# Patient Record
Sex: FEMALE | Race: WHITE | Employment: FULL TIME | URBAN - METROPOLITAN AREA
[De-identification: names, ages, dates, MRNs, and addresses within clinical notes are randomized per-mention and may not be internally consistent; named-entity substitution may affect disease eponyms.]

---

## 2021-06-16 ENCOUNTER — HOSPITAL ENCOUNTER (OUTPATIENT)
Dept: GENERAL RADIOLOGY | Age: 66
Discharge: HOME | End: 2021-06-16
Payer: COMMERCIAL

## 2021-06-16 ENCOUNTER — OFFICE VISIT (OUTPATIENT)
Dept: ORTHOPEDIC SURGERY | Age: 66
End: 2021-06-16
Payer: COMMERCIAL

## 2021-06-16 VITALS
WEIGHT: 148 LBS | OXYGEN SATURATION: 100 % | BODY MASS INDEX: 27.94 KG/M2 | TEMPERATURE: 97.5 F | HEIGHT: 61 IN | HEART RATE: 83 BPM

## 2021-06-16 DIAGNOSIS — M75.01 ADHESIVE CAPSULITIS OF BOTH SHOULDERS: ICD-10-CM

## 2021-06-16 DIAGNOSIS — M75.02 ADHESIVE CAPSULITIS OF BOTH SHOULDERS: Primary | ICD-10-CM

## 2021-06-16 DIAGNOSIS — M75.01 ADHESIVE CAPSULITIS OF BOTH SHOULDERS: Primary | ICD-10-CM

## 2021-06-16 DIAGNOSIS — M75.02 ADHESIVE CAPSULITIS OF BOTH SHOULDERS: ICD-10-CM

## 2021-06-16 PROCEDURE — 99204 OFFICE O/P NEW MOD 45 MIN: CPT | Performed by: PHYSICAL MEDICINE & REHABILITATION

## 2021-06-16 PROCEDURE — 73030 X-RAY EXAM OF SHOULDER: CPT

## 2021-06-16 RX ORDER — IBUPROFEN 800 MG/1
800 TABLET ORAL
Qty: 30 TABLET | Refills: 0 | Status: SHIPPED | OUTPATIENT
Start: 2021-06-16

## 2021-06-16 RX ORDER — ESTRADIOL 0.5 MG/1
TABLET ORAL
COMMUNITY
Start: 2021-06-05

## 2021-06-16 NOTE — PATIENT INSTRUCTIONS
Frozen Shoulder: Care Instructions Your Care Instructions Frozen shoulder is stiffness, pain, and trouble moving your shoulder. It may happen after an injury or overuse, or from a disease such as diabetes or a stroke. You may have pain that keeps you from using your shoulder. However, you need to move your shoulder. If you do not move it, it will get more stiff and sore. Your doctor may order an X-ray to make sure there is not another cause for your stiff shoulder. You can treat frozen shoulder with heat, stretching, over-the-counter pain medicines, and physical therapy. Your doctor also may inject medicine into your shoulder to reduce pain and swelling. It can take a year or more to get better. Surgery is almost never needed. Follow-up care is a key part of your treatment and safety. Be sure to make and go to all appointments, and call your doctor if you are having problems. It's also a good idea to know your test results and keep a list of the medicines you take. How can you care for yourself at home? · Take pain medicines exactly as directed. ? If the doctor gave you a prescription medicine for pain, take it as prescribed. ? If you are not taking a prescription pain medicine, ask your doctor if you can take an over-the-counter medicine. · Put a heating pad set on low or a warm, wet towel wrapped in plastic on your shoulder. The heat may make it easier to stretch your shoulder. · Follow your doctor's advice for stretches and exercises. · Go to physical therapy if your doctor suggests it. · Try these stretching exercises to reduce stiffness if your doctor says it is okay. Do the exercises slowly to avoid injury. Put a warm, wet towel on your shoulder before exercising. Stop any exercise that increases pain. ? Pendulum exercise. While leaning forward and holding onto a table or the back of a chair with your good arm, bend at the waist, allowing your affected arm to hang straight down.  Swing the affected arm back and forth like a pendulum, then in circles that start small and gradually grow larger as pain allows. Try this for about 2 or 3 minutes, several times a day. Once pain begins to go away, you can do this exercise while holding a 1- or 2-pound weight. ? Wall climbing (to the side). Stand with your side to a wall so that your fingers can just touch it. Then turn so your body is turned slightly toward the wall. Walk the fingers of your injured arm up the wall as high as pain permits. Hold that position for a count of 15 to 30 seconds. Walk your fingers down to the starting position. Repeat 2 to 4 times, trying to reach higher each time. ? Wall climbing (to the front). Face a wall, standing so your fingers can just touch it. Walk the fingers of your affected arm up the wall as high as pain permits. Hold that position for a count of 15 to 30 seconds. Slowly walk your fingers to the starting position. Repeat 2 to 4 times, trying to reach higher each time. When should you call for help? Call your doctor now or seek immediate medical care if: 
  · You have severe pain.  
  · Your arm is cool or pale or changes color.  
  · You have tingling or numbness in your arm. Watch closely for changes in your health, and be sure to contact your doctor if: 
  · You have increased pain.  
  · You have new pain that develops in another area. For example, you have pain in your arm, hand, or elbow.  
  · You do not get better as expected. Where can you learn more? Go to http://www.gray.com/ Enter C048 in the search box to learn more about \"Frozen Shoulder: Care Instructions. \" Current as of: November 16, 2020               Content Version: 12.8 © 2080-0319 [employer]. Care instructions adapted under license by [employer] (which disclaims liability or warranty for this information).  If you have questions about a medical condition or this instruction, always ask your healthcare professional. [place] [address] any warranty or liability for your use of this information. Frozen Shoulder: Exercises Introduction Here are some examples of exercises for you to try. The exercises may be suggested for a condition or for rehabilitation. Start each exercise slowly. Ease off the exercises if you start to have pain. You will be told when to start these exercises and which ones will work best for you. How to do the exercises Neck stretches 1. Look straight ahead, and tip your right ear to your right shoulder. Do not let your left shoulder rise up as you tip your head to the right. 2. Hold 15 to 30 seconds. 3. Tilt your head to the left. Do not let your right shoulder rise up as you tip your head to the left. 4. Hold for 15 to 30 seconds. 5. Repeat 2 to 4 times to each side. Shoulder rolls 1. Sit comfortably with your feet shoulder-width apart. You can also do this exercise while standing. 2. Roll your shoulders up, then back, and then down in a smooth, circular motion. 3. Repeat 2 to 4 times. Shoulder flexion (lying down) For this exercise, you will need a wand. To make a wand, use a piece of PVC pipe or a broom handle with the broom removed. Make the wand about a foot wider than your shoulders. 1. Lie on your back, holding a wand with your hands. Your palms should face down as you hold the wand. Place your hands slightly wider than your shoulders. 2. Keeping your elbows straight, slowly raise your arms over your head until you feel a stretch in your shoulders, upper back, and chest. 
3. Hold 15 to 30 seconds. 4. Repeat 2 to 4 times. Shoulder rotation (lying down) To make a wand, use a piece of PVC pipe or a broom handle with the broom removed. Make the wand about a foot wider than your shoulders. 1. Lie on your back and hold a wand in both hands with your elbows bent and your palms up.  
2. Keeping your elbows close to your body, move the wand across your body toward the arm that has pain. 3. Hold for 15 to 30 seconds. 4. Repeat 2 to 4 times. Shoulder internal rotation with towel 1. Roll up a towel lengthwise. Hold the towel above and behind your head with the arm that is not sore. 2. With your sore arm, reach behind your back and grasp the towel. 3. Using the arm above your head, pull the towel upward until you feel a stretch on the front and outside of your sore shoulder. 4. Hold 15 to 30 seconds. 5. Relax and move the towel back down to the starting position. 6. Repeat 2 to 4 times. Shoulder blade squeeze 1. While standing with your arms at your sides, squeeze your shoulder blades together. Do not raise your shoulders up as you are squeezing. 2. Hold for 6 seconds. 3. Repeat 8 to 12 times. Follow-up care is a key part of your treatment and safety. Be sure to make and go to all appointments, and call your doctor if you are having problems. It's also a good idea to know your test results and keep a list of the medicines you take. Where can you learn more? Go to http://www.gray.com/ Enter R144 in the search box to learn more about \"Frozen Shoulder: Exercises. \" Current as of: November 16, 2020               Content Version: 12.8 © 6065-2686 [employer]. Care instructions adapted under license by [employer] (which disclaims liability or warranty for this information). If you have questions about a medical condition or this instruction, always ask your healthcare professional. [place] [address] any warranty or liability for your use of this information.
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
## 2021-06-16 NOTE — PROGRESS NOTES
Saira Hurtado presents today for   Chief Complaint   Patient presents with    Shoulder Pain     bilateral    Arm Pain     bilateral    Back Pain       Is someone accompanying this pt? no    Is the patient using any DME equipment during OV? no    Coordination of Care:  1. Have you been to the ER, urgent care clinic since your last visit? no  Hospitalized since your last visit? no    2. Have you seen or consulted any other health care providers outside of the [address] [place] [address] since your last visit? Yes, chiropractor. Include any pap smears or colon screening.  no

## 2021-06-17 ENCOUNTER — TELEPHONE (OUTPATIENT)
Dept: ORTHOPEDIC SURGERY | Age: 66
End: 2021-06-17

## 2021-07-01 ENCOUNTER — HOSPITAL ENCOUNTER (OUTPATIENT)
Dept: PHYSICAL THERAPY | Age: 66
Discharge: HOME | End: 2021-07-01
Attending: PHYSICAL MEDICINE & REHABILITATION
Payer: COMMERCIAL

## 2021-07-01 PROCEDURE — 97110 THERAPEUTIC EXERCISES: CPT

## 2021-07-01 PROCEDURE — 97161 PT EVAL LOW COMPLEX 20 MIN: CPT

## 2021-07-01 PROCEDURE — 97535 SELF CARE MNGMENT TRAINING: CPT

## 2021-07-01 NOTE — PROGRESS NOTES
PT DAILY TREATMENT NOTE/SHOULDER EVAL     Patient Name: Valentino Hitchcock  Date:2021  : 1955  [x]  Patient  Verified  Payor: Akbar Ld / Plan: Sudha Samano / Product Type: HMO /    In time:130  Out time:215  Total Treatment Time (min): 45  Visit #: 1 of 16    Medicare/BCBS Only   Total Timed Codes (min):  23 1:1 Treatment Time:  23       Treatment Area: Pain in right shoulder [M25.511]  Pain in left shoulder [M25.512]    SUBJECTIVE  Pain Level (0-10 scale): 2   []constant [x]intermittent [x]improving []worsening []no change since onset  Worse sleeping and quick movements better cold/ice, veterinary liniment ointment  Any medication changes, allergies to medications, adverse drug reactions, diagnosis change, or new procedure performed?: [x] No    [] Yes (see summary sheet for update)  Subjective functional status/changes:     PLOF: I all areas of ADLS and activities, no AD, tolerated household and community activities and word demands, yard work and painting , drove  Limitations to PLOF: pain  Mechanism of Injury: insideous onset , aching sometime approx 2 months ago and after beginning chiropractic care last 2020  Current symptoms/Complaints: 72 YO female diagnosed as above and with S/S consistent with above diagnosis presents to skilled outpatient PT [facility] B shoulder pain , no ROM and aching. Right > left and onset sometime after beginning chiropractic care in 2020 . She is right hand dominant.  Pain today is 2/10  Previous Treatment/Compliance: MD, liniment, ice, medication, US and xrays,   PMHx/Surgical Hx: arthritis, B bunion surgery, hysterectomy surgery   Work Hx: Full time   Living Situation: lives in a 1 story house, not alone  Pt Goals: get motion back and no more pain  Barriers: [x]pain []financial []time []transportation []other  Motivation: good  Substance use: []Alcohol []Tobacco []other:   FABQ Score: []low []elevate  Cognition: A & O x 4 Other:     OBJECTIVE/EXAMINATION  Domestic Life: household and community activities, work demands, yard work and painiting  Activity/Recreational Limitations: pain   Mobility: I  Self Care: I , but with difficulty         22 min [x]Eval                  []Re-Eval       15 min Therapeutic Exercise:  [x] See flow sheet :   Rationale: increase ROM, increase strength and improve coordination to improve the patients ability to tolerate ADLS and activities    8 min Self care  []  See flow sheet :   Rationale: FOTO, POC, goals, ice, HEP,  to improve the patients ability to tolerate ADLS and activities            With   [] TE   [] TA   [] neuro   [] other: Patient Education: [x] Review HEP    [] Progressed/Changed HEP based on:   [] positioning   [] body mechanics   [] transfers   [] heat/ice application    [] other:      Other Objective/Functional Measures:      Physical Therapy Evaluation - Shoulder    Posture: [] Poor    [x] Fair    [] Good    Describe: mild FH and RS    ROM:  [] Unable to assess at this time                                           AROM                                                              PROM   Left Right  Left Right   Flexion 105 96 Flexion     Extension   Extension     Scaption/ABD 98 65 Scaptin/ABD     ER @ 0 Degrees 55 20 ER @ 0 Degrees     ER @ 90 Degrees   ER @ 90 Degrees     IR @ 90 Degrees   IR @ 90 Degrees       Left UE HBH able, HBB to left buttock  Right UE HBH to the ear, HBB to buttock   End Feel / Painful Arc: B shoulders tight and painful  End feels    Strength:   [] Unable to assess at this time                                                                            L (1-5) R (1-5) Pain   Flexors 4+ 4+ [] Yes   [] No   Abductors 4+ 4+ [] Yes   [] No   External Rotators 4+ 4+ [] Yes   [] No   Internal Rotators 5 5 [] Yes   [] No   Supraspinatus   [] Yes   [] No   Serratus Anterior   [] Yes   [] No   Lower Trapezius   [] Yes   [] No   Elbow Flexion   [] Yes   [] No   Elbow Extension   [] Yes   [] No       Scapulohumoral Control / Rhythm:  Able to eccentrically lower with good control? Left: [x] Yes   [] No     Right: [x] Yes   [] No    Accessory Motions:    Palpation  [] Min  [x] Mod  [] Severe    Location:B shouldes right > eft TTP   [] Min  [] Mod  [] Severe    Location:  [] Min  [] Mod  [] Severe    Location:         Other Tests / Comments:        Pain Level (0-10 scale) post treatment: 2    ASSESSMENT/Changes in Function: Patient demonstrates the potential to make gains with improved ROM, strength, endurance/activity tolerance, functional FOTO survey score   and all within a reasonable time frame so as to increase their functional independence with ADLs and activities for carryover to  Improved quality of life, tolerance to household and community activities and work demands. Patient requires skilled Physical Therapy so as to monitor their response to and modify their treatment plan accordingly. Patient appears to be an appropriate candidate for skilled outpatient Physical Therapy. Patient will continue to benefit from skilled PT services to modify and progress therapeutic interventions, address ROM deficits, address strength deficits, analyze and address soft tissue restrictions, analyze and cue movement patterns, analyze and modify body mechanics/ergonomics, assess and modify postural abnormalities and instruct in home and community integration to attain remaining goals.      [x]  See Plan of Care  []  See progress note/recertification  []  See Discharge Summary         Progress towards goals / Updated goals:       PLAN  [x]  Upgrade activities as tolerated     [x]  Continue plan of care  []  Update interventions per flow sheet       []  Discharge due to:_  []  Other:_      Jose Montejo, PT 7/1/2021  1:40 PM

## 2021-07-01 NOTE — PROGRESS NOTES
[facility]  [facility], [address] [place] [address], [facility] [address]  [phone] fax      Plan of Care/ Statement of Necessity for Physical Therapy Services    Patient name: Sherron Peña Start of Care: 2021   Referral source: Taniya Carpenter* : 1955    Medical Diagnosis: Pain in right shoulder [M25.511]  Pain in left shoulder [M25.512]  Payor: BLUE CROSS / Plan: Polo Bhagat / Product Type: HMO /  Onset Date:approximately 2 months ago    Treatment Diagnosis: B shoulder pain   Prior Hospitalization: see medical history Provider#: [id number]   Medications: Verified on Patient summary List    Comorbidities: arthritis, B bunion surgery, hysterectomy   Prior Level of Function:  I all areas of ADLS and activities, no AD, tolerated household and community activities and word demands, yard work and painting , drove     The Plan of Care and following information is based on the information from the initial evaluation. Assessment/ key information:  72 YO female diagnosed as above and with S/S consistent with above diagnosis presents to skilled outpatient PT [facility] B shoulder pain , no ROM and aching. Right > left and onset sometime after beginning chiropractic care in 2020 . She is right hand dominant. Pain today is 2/10. She B shoulder pain, LOM, decrease strength, painful and tight end feels. She has decrease tolerance to ADLS and activities. Patient demonstrates the potential to make gains with improved ROM, strength, endurance/activity tolerance, functional FOTO survey score   and all within a reasonable time frame so as to increase their functional independence with ADLs and activities for carryover to  Improved quality of life, tolerance to household and community activities and work demands. Patient requires skilled Physical Therapy so as to monitor their response to and modify their treatment plan accordingly.  Patient appears to be an appropriate candidate for skilled outpatient Physical Therapy. Evaluation Complexity History MEDIUM  Complexity : 1-2 comorbidities / personal factors will impact the outcome/ POC ; Examination MEDIUM Complexity : 3 Standardized tests and measures addressing body structure, function, activity limitation and / or participation in recreation  ;Presentation LOW Complexity : Stable, uncomplicated  ;Clinical Decision Making HIGH Complexity : FOTO score of 1- 25   Overall Complexity Rating: LOW   Problem List: pain affecting function, decrease ROM, decrease strength, decrease ADL/ functional abilitiies, decrease activity tolerance, decrease flexibility/ joint mobility, decrease transfer abilities and other FOTO 18   Treatment Plan may include any combination of the following: Therapeutic exercise, Therapeutic activities, Neuromuscular re-education, Physical agent/modality, Manual therapy, Patient education, Self Care training and Home safety training  Patient / Family readiness to learn indicated by: asking questions, trying to perform skills and interest  Persons(s) to be included in education: patient (P)  Barriers to Learning/Limitations: None  Patient Goal (s): get motion back and no more pain  Patient Self Reported Health Status: good  Rehabilitation Potential: good    Short Term Goals: To be accomplished in 5 treatments:   1 patient will have established and be I with HEP to aid with self management at discharge   EVAL issued HEP   CURRENT     Long Term Goals:  To be accomplished in 16 treatments:   1 patient will have FOTO 56 to show significant and projected increase tolerance to ADLs and activities at home and work   EVAL 18   CURRENT   2 patient will have overall 50% improvement to aid with increase tolerance to household and work activities   EVAL  Worse with quick movements   CURRENT   3 patient will have B shoulder F 125 and abd 125 to aid with increase tolerance to shoulder height reaching    EVAL some difficulty , F right 96, left 105, abd right 65, left 98   CURRENT   4 patient will have pain 1/10 to aid with increase tolerance to improved sleep patterns   EVAL 2   CURRENT    Frequency / Duration: Patient to be seen 1-2 times per week for 8 weeks. Patient/ Caregiver education and instruction: Diagnosis, prognosis, self care, activity modification and exercises   [x]  Plan of care has been reviewed with PETAR Lucia, PT 7/1/2021 2:19 PM  ________________________________________________________________________    I certify that the above Therapy Services are being furnished while the patient is under my care. I agree with the treatment plan and certify that this therapy is necessary.     Physician's Signature:____________Date:_________TIME:________     Tito Panda*  ** Signature, Date and Time must be completed for valid certification **      Please sign and return to In . Kanchan Chaudhry [address], [address] [place] [address], [address] [place] [address],[facility] [address]  [phone] fax

## 2021-07-07 ENCOUNTER — HOSPITAL ENCOUNTER (OUTPATIENT)
Dept: PHYSICAL THERAPY | Age: 66
Discharge: HOME | End: 2021-07-07
Attending: PHYSICAL MEDICINE & REHABILITATION
Payer: COMMERCIAL

## 2021-07-07 PROCEDURE — 97140 MANUAL THERAPY 1/> REGIONS: CPT

## 2021-07-07 PROCEDURE — 97110 THERAPEUTIC EXERCISES: CPT

## 2021-07-07 NOTE — PROGRESS NOTES
PT DAILY TREATMENT NOTE     Patient Name: Tonie Todd  Date:2021  : 1955  [x]  Patient  Verified  Payor: Deborah Jenkins / Plan: Pushpa Torre / Product Type: HMO /    In time:2:15  Out time:3:00  Total Treatment Time (min): 45  Visit #: 2 of 16    Medicare/BCBS Only   Total Timed Codes (min):  45 1:1 Treatment Time:  45       Treatment Area: Pain in right shoulder [M25.511]  Pain in left shoulder [M25.512]    SUBJECTIVE  Pain Level (0-10 scale): 3  Any medication changes, allergies to medications, adverse drug reactions, diagnosis change, or new procedure performed?: [x] No    [] Yes (see summary sheet for update)  Subjective functional status/changes:   [] No changes reported  Pt states she has been doing her exercises every day    OBJECTIVE    33 min Therapeutic Exercise:  [x] See flow sheet :   Rationale: increase ROM and increase strength to improve the patients ability to perform ADLs    12 min Manual Therapy:  Right scap mobs, DTM to medial border of scap. Kaz post/inf [address] jt mobs with PROM, Kaz DTM to UT/LS   The manual therapy interventions were performed at a separate and distinct time from the therapeutic activities interventions. Rationale: decrease pain, increase ROM and increase tissue extensibility to improve functional mobility             With   [] TE   [] TA   [] neuro   [] other: Patient Education: [x] Review HEP    [] Progressed/Changed HEP based on:   [] positioning   [] body mechanics   [] transfers   [] heat/ice application    [] other:      Other Objective/Functional Measures:   First f/u after eval     Pain Level (0-10 scale) post treatment: 2    ASSESSMENT/Changes in Function: Initiated treatment program per flow sheet. Reviewed HEP for understanding and compliance. Demo's good tolerance to treatment today. Greatly limited with IR, notes some elbow pain with ER. Right >left shoulder pain. Pt edu to avoid overly painful stretching to avoid incr'd pain and inflammation. Decr'd tension and pain following treatment today. Patient will continue to benefit from skilled PT services to modify and progress therapeutic interventions, address functional mobility deficits, address ROM deficits, address strength deficits, analyze and address soft tissue restrictions, analyze and cue movement patterns, analyze and modify body mechanics/ergonomics and assess and modify postural abnormalities to attain remaining goals. []  See Plan of Care  []  See progress note/recertification  []  See Discharge Summary         Progress towards goals / Updated goals:  Short Term Goals: To be accomplished in 5 treatments:               1 patient will have established and be I with HEP to aid with self management at discharge               EVAL issued HEP               CURRENT: Met, pt reports compliance with HEP 7/7/2021                 Long Term Goals:  To be accomplished in 16 treatments:               1 patient will have FOTO 56 to show significant and projected increase tolerance to ADLs and activities at home and work               EVAL 18               CURRENT               2 patient will have overall 50% improvement to aid with increase tolerance to household and work activities               EVAL  Worse with quick movements               CURRENT               3 patient will have B shoulder F 125 and abd 125 to aid with increase tolerance to shoulder height reaching                EVAL some difficulty , F right 96, left 105, abd right 65, left 98               CURRENT               4 patient will have pain 1/10 to aid with increase tolerance to improved sleep patterns               EVAL 2               CURRENT    PLAN  []  Upgrade activities as tolerated     [x]  Continue plan of care  []  Update interventions per flow sheet       []  Discharge due to:_  []  Other:_      Richard Alcantar PTA 7/7/2021  2:13 PM    Future Appointments   Date Time Provider Radha Duran   7/7/2021  2:15 PM Winter, Faby Angela, PTA [facility]   7/15/2021  1:30 PM Monet Morgan, PT [facility]   7/22/2021  1:30 PM Montana Bashir, PT [facility]   7/29/2021  1:30 PM Danica Bashir, PT [facility]

## 2021-07-15 ENCOUNTER — HOSPITAL ENCOUNTER (OUTPATIENT)
Dept: PHYSICAL THERAPY | Age: 66
Discharge: HOME | End: 2021-07-15
Attending: PHYSICAL MEDICINE & REHABILITATION
Payer: COMMERCIAL

## 2021-07-15 PROCEDURE — 97110 THERAPEUTIC EXERCISES: CPT | Performed by: PHYSICAL THERAPIST

## 2021-07-15 PROCEDURE — 97530 THERAPEUTIC ACTIVITIES: CPT | Performed by: PHYSICAL THERAPIST

## 2021-07-15 PROCEDURE — 97112 NEUROMUSCULAR REEDUCATION: CPT | Performed by: PHYSICAL THERAPIST

## 2021-07-15 NOTE — PROGRESS NOTES
PT DAILY TREATMENT NOTE     Patient Name: Shelley Bunch  Date:7/15/2021  : 1955  [x]  Patient  Verified  Payor: Marlena Mary / Plan: Bautista Sahu / Product Type: HMO /    In time:130P  Out time:218   Total Treatment Time (min): 48min  Visit #: 3 of 16    Medicare/BCBS Only   Total Timed Codes (min):  48 1:1 Treatment Time:  40       Treatment Area: Pain in right shoulder [M25.511]  Pain in left shoulder [M25.512]    SUBJECTIVE  Pain Level (0-10 scale): 0/10  Any medication changes, allergies to medications, adverse drug reactions, diagnosis change, or new procedure performed?: [x] No    [] Yes (see summary sheet for update)  Subjective functional status/changes:   [] No changes reported  Patient is progressing towards goals of increased activity tolerance - states she is now only having pain at night but no longer needs to take the pain medications    OBJECTIVE    15 min Therapeutic Exercise:  [x] See flow sheet :   Rationale: increase ROM and increase strength to improve the patients ability to A/ROM and decrease pain with movement. 15 min Therapeutic Activity:  [x]  See flow sheet :   Rationale: increase strength and improve coordination  to improve the patients ability to Tolerate basic ADLs and job-related tasks without pain. 18 min Neuromuscular Re-education:  [x]  See flow sheet :   Rationale: improve coordination, improve balance and increase proprioception  to improve the patients ability to perform activities with good form and proprioception with tactile and verbal cuing appropriately. With   [x] TE   [x] TA   [x] neuro   [] other: Patient Education: [x] Review HEP    [x] Progressed/Changed HEP based on:   [x] positioning   [] body mechanics   [] transfers   [] heat/ice application    [] other:      Other Objective/Functional Measures: AAROM now 140 on the Right with overhead activities.       Pain Level (0-10 scale) post treatment: 0/10    ASSESSMENT/Changes in Function: Patient is progressing well towards goals. Is consistent with home program and demonstrating improved tolerance to overhead and behind the back motion. Patient will continue to benefit from skilled PT services to modify and progress therapeutic interventions, address functional mobility deficits, address ROM deficits, address strength deficits, analyze and address soft tissue restrictions, analyze and cue movement patterns, analyze and modify body mechanics/ergonomics, assess and modify postural abnormalities, address imbalance/dizziness and instruct in home and community integration to attain remaining goals.      [x]  See Plan of Care  []  See progress note/recertification  []  See Discharge Summary         Progress towards goals / Updated goals:  Short Term Goals: To be accomplished in 5 treatments:               1 patient will have established and be I with HEP to aid with self management at discharge               EVAL issued HEP               EMIUFCM: Met, pt reports compliance with HEP                  Long Term Goals: To be accomplished in 16 treatments:               1 patient will have FOTO 56 to show significant and projected increase tolerance to ADLs and activities at home and work               EVAL 18               VBDROEK progressing - feels much improved               2 patient will have overall 50% improvement to aid with increase tolerance to household and work activities               EVAL  Worse with quick movements               TTQGMOE feels more than 50% better - progressing               3 patient will have B shoulder F 125 and abd 125 to aid with increase tolerance to shoulder height reaching                EVAL some difficulty , F right 96, left 105, abd right 65, left 98               CURRENT               4 patient will have pain 1/10 to aid with increase tolerance to improved sleep patterns               EVAL 2               ENQRSHE     PLAN  [x]  Upgrade activities as tolerated []  Continue plan of care  []  Update interventions per flow sheet       []  Discharge due to:_  []  Other:_      Ning Zimmerman, PT 7/15/2021  4:29 PM    Future Appointments   Date Time Provider Radha Duran   7/22/2021  1:30 PM Dillon Leong, PT [facility]   7/29/2021  1:30 PM [facility]

## 2021-07-22 ENCOUNTER — HOSPITAL ENCOUNTER (OUTPATIENT)
Dept: PHYSICAL THERAPY | Age: 66
Discharge: HOME | End: 2021-07-22
Attending: PHYSICAL MEDICINE & REHABILITATION
Payer: COMMERCIAL

## 2021-07-22 PROCEDURE — 97140 MANUAL THERAPY 1/> REGIONS: CPT | Performed by: PHYSICAL THERAPIST

## 2021-07-22 PROCEDURE — 97014 ELECTRIC STIMULATION THERAPY: CPT | Performed by: PHYSICAL THERAPIST

## 2021-07-22 PROCEDURE — 97112 NEUROMUSCULAR REEDUCATION: CPT | Performed by: PHYSICAL THERAPIST

## 2021-07-22 PROCEDURE — 97530 THERAPEUTIC ACTIVITIES: CPT | Performed by: PHYSICAL THERAPIST

## 2021-07-22 PROCEDURE — 97110 THERAPEUTIC EXERCISES: CPT | Performed by: PHYSICAL THERAPIST

## 2021-07-22 NOTE — PROGRESS NOTES
PT DAILY TREATMENT NOTE     Patient Name: Kristian Molina  Date:2021  : 1955  [x]  Patient  Verified  Payor: Jassi Cisneros / Plan: Sarah Richardson / Product Type: HMO /    In time:136p  Out time:240P  Total Treatment Time (min): 64min  Visit #: 4 of 16    Medicare/BCBS Only   Total Timed Codes (min):  53 1:1 Treatment Time:  48       Treatment Area: Pain in right shoulder [M25.511]  Pain in left shoulder [M25.512]    SUBJECTIVE  Pain Level (0-10 scale): 1  Any medication changes, allergies to medications, adverse drug reactions, diagnosis change, or new procedure performed?: [x] No    [] Yes (see summary sheet for update)  Subjective functional status/changes:   [] No changes reported  Patient states she has been feeling an ache deep inside her shoulder since last visit but still has much improved range compared to when she first started therapy. OBJECTIVE    Modality rationale: decrease pain and increase tissue extensibility to improve the patients ability to improve exercise-induced muscle soreness.     Min Type Additional Details   11 [x] Estim:  [x]Unatt       [x]IFC  []Premod                        []Other:  []w/ice   []w/heat  Position: seated  Location: Right shoulder    [] Estim: []Att    []TENS instruct  []NMES                    []Other:  []w/US   []w/ice   []w/heat  Position:  Location:    []  Traction: [] Cervical       []Lumbar                       [] Prone          []Supine                       []Intermittent   []Continuous Lbs:  [] before manual  [] after manual    []  Ultrasound: []Continuous   [] Pulsed                           []1MHz   []3MHz W/cm2:  Location:    []  Iontophoresis with dexamethasone         Location: [] Take home patch   [] In clinic    []  Ice     []  heat  []  Ice massage  []  Laser   []  Anodyne Position:  Location:    []  Laser with stim  []  Other:  Position:  Location:    []  Vasopneumatic Device    []  Right     []  Left  Pre-treatment girth:  Post-treatment girth:  Measured at (location):  Pressure:       [] lo [] med [] hi   Temperature: [] lo [] med [] hi   [] Skin assessment post-treatment:  []intact []redness- no adverse reaction    []redness  adverse reaction:     15 min Therapeutic Exercise:  [x] See flow sheet :   Rationale: increase ROM and increase strength to improve the patients ability to increase A/ROM and decrease pain with movement. 15 min Therapeutic Activity:  [x]  See flow sheet :   Rationale: increase strength and improve coordination  to improve the patients ability to Tolerate basic ADLs and job-related tasks without pain. 13 min Neuromuscular Re-education:  [x]  See flow sheet :   Rationale: improve coordination, improve balance and increase proprioception  to improve the patients ability to perform activities with good form and proprioception with tactile and verbal cuing appropriately. 10 min Manual Therapy:  Grade 2-3 mobs in sidelying to scapulothoracic joint, grades 2-3 to [facility] joint in supine into P/A and inf/sup directions. The manual therapy interventions were performed at a separate and distinct time from the therapeutic activities interventions. Rationale: decrease pain, increase ROM and increase tissue extensibility to A/ROM and decrease pain with movement. With   [x] TE   [x] TA   [x] neuro   [] other: Patient Education: [x] Review HEP    [x] Progressed/Changed HEP based on:   [] positioning   [] body mechanics   [] transfers   [] heat/ice application    [] other:      Other Objective/Functional Measures: About 130 degrees flexion actively on the Right, about 145 to 150 on the Left     Pain Level (0-10 scale) post treatment: 0/10    ASSESSMENT/Changes in Function: Patient is progressing towards goals of increased activity tolerance and decreased pain, continues to need specific verbal and tactile cuing for proper form and positioning.      Patient will continue to benefit from skilled PT services to modify and progress therapeutic interventions, address functional mobility deficits, address ROM deficits, address strength deficits, analyze and address soft tissue restrictions, analyze and cue movement patterns, analyze and modify body mechanics/ergonomics, assess and modify postural abnormalities, address imbalance/dizziness and instruct in home and community integration to attain remaining goals.      [x]  See Plan of Care  []  See progress note/recertification  []  See Discharge Summary         Progress towards goals / Updated goals:  Short Term Goals: To be accomplished in 5 treatments:               1 patient will have established and be I with HEP to aid with self management at discharge               EVAL issued HEP               DYHZXVM: Met, pt reports compliance with HEP                  Long Term Goals: To be accomplished in 16 treatments:               1 patient will have FOTO 56 to show significant and projected increase tolerance to ADLs and activities at home and work               EVAL 18               MLYPCDZ progressing - feels much improved by about 80%               2 patient will have overall 50% improvement to aid with increase tolerance to household and work activities               EVAL  Worse with quick movements               DOQIJBZ feels more than 50% better - MET               3 patient will have B shoulder F 125 and abd 125 to aid with increase tolerance to shoulder height reaching                EVAL some difficulty , F right 96, left 105, abd right 65, left 98               CURRENT Right 130 flexion and abduction - MET               4 patient will have pain 1/10 to aid with increase tolerance to improved sleep patterns               EVAL 2               PWPKURU 1-2/10 at worse - MET    PLAN  [x]  Upgrade activities as tolerated     []  Continue plan of care  []  Update interventions per flow sheet       []  Discharge due to:_  []  Other:_      Danica Bashir PT 7/22/2021  4:03 PM    Future Appointments   Date Time Provider Radha Duran   7/29/2021  1:30 PM Governor Tobias, PT [facility]

## 2021-07-29 ENCOUNTER — HOSPITAL ENCOUNTER (OUTPATIENT)
Dept: PHYSICAL THERAPY | Age: 66
Discharge: HOME | End: 2021-07-29
Attending: PHYSICAL MEDICINE & REHABILITATION
Payer: COMMERCIAL

## 2021-07-29 PROCEDURE — 97112 NEUROMUSCULAR REEDUCATION: CPT | Performed by: PHYSICAL THERAPIST

## 2021-07-29 PROCEDURE — 97110 THERAPEUTIC EXERCISES: CPT | Performed by: PHYSICAL THERAPIST

## 2021-07-29 PROCEDURE — 97530 THERAPEUTIC ACTIVITIES: CPT | Performed by: PHYSICAL THERAPIST

## 2021-07-29 PROCEDURE — 97140 MANUAL THERAPY 1/> REGIONS: CPT | Performed by: PHYSICAL THERAPIST

## 2021-07-29 NOTE — PROGRESS NOTES
[facility]  [address], [facility] [facility], [facility] [address] [address]  [phone] fax    Physician Update  [x] Progress Note  [] Discharge Summary  Patient name: Cortney Irizarry Start of Care: 2021   Referral source: Xavier Meg* : 1955                Medical Diagnosis: Pain in right shoulder [M25.511]  Pain in left shoulder [M25.512]  Payor: Tomas Roche / Plan: Gerda Arriaza / Product Type: HMO /  Onset Date:approximately 2 months ago                Treatment Diagnosis: B shoulder pain   Prior Hospitalization: see medical history Provider#: [id number]   Medications: Verified on Patient summary List    Comorbidities: arthritis, B bunion surgery, hysterectomy   Prior Level of Function:  I all areas of ADLS and activities, no AD, tolerated household and community activities and word demands, yard work and painting , drove                 Visits from [employer] [facility]: 5    Missed Visits: 0    Status at Evaluation/Last Progress Note:  74 YO female diagnosed as above and with S/S consistent with above diagnosis presents to skilled outpatient PT [facility] B shoulder pain , no ROM and aching. Right > left and onset sometime after beginning chiropractic care in 2020 . She is right hand dominant. Pain today is 2/10. She B shoulder pain, LOM, decrease strength, painful and tight end feels. She has decrease tolerance to ADLS and activities.    Progress towards Goals: Short Term Goals: To be accomplished in 5 treatments:               1 patient will have established and be I with HEP to aid with self management at discharge               EVAL issued HEP               GVYRSEF: Met, pt reports compliance with HEP                  Long Term Goals: To be accomplished in 16 treatments:               1 patient will have FOTO 56 to show significant and projected increase tolerance to ADLs and activities at home and work               EVAL 18               CURRENT MET - 76               2 patient will have overall 50% improvement to aid with increase tolerance to household and work activities               EVAL  Worse with quick movements               CURRENT feels more than 50% better - MET               3 patient will have B shoulder F 125 and abd 125 to aid with increase tolerance to shoulder height reaching                EVAL some difficulty , F right 96, left 105, abd right 65, left 98               CURRENT Right 130 flexion and abduction - MET               4 patient will have pain 1/10 to aid with increase tolerance to improved sleep patterns               EVAL 2               CURRENT 1-2/10 at worse - MET  Goals: to be achieved in 4 weeks:  1. Patient will be able to lift 50# box to shelf overhead with some to little activity to facilitate ease of movement when traveling. PN: much difficulty  2. Patient will be able to Reach T12 with her Right thumb to facilitate increased independence with donning and doffing her undergarments. PN: Right SIJ    ASSESSMENT/RECOMMENDATIONS:  [x]Continue therapy per initial plan/protocol at a frequency of  1 x per week for 4 weeks  []Continue therapy with the following recommended changes:_____________________      _____________________________________________________________________  []Discontinue therapy progressing towards or have reached established goals  []Discontinue therapy due to lack of appreciable progress towards goals  []Discontinue therapy due to lack of attendance or compliance  []Await Physician's recommendations/decisions regarding therapy  []Other:________________________________________________________________    Thank you for this referral. Kisha Bashir, PT 7/29/2021 2:44 PM  NOTE TO PHYSICIAN:  PLEASE COMPLETE THE ORDERS BELOW AND   FAX TO [facility]: [phone]  If you are unable to process this request in 24 hours please contact our office: [phone]    ?  I have read the above report and request that my patient continue as recommended. ? I have read the above report and request that my patient continue therapy with the following changes/special instructions:_____________________________________  ? I have read the above report and request that my patient be discharged from therapy.     Physician's Signature:____________Date:_________TIME:________     Tito Gallego*  ** Signature, Date and Time must be completed for valid certification **

## 2021-07-29 NOTE — PROGRESS NOTES
PT DAILY TREATMENT NOTE     Patient Name: Francisco Age  Date:2021  : 1955  [x]  Patient  Verified  Payor: Gloria Mcclendon / Plan: [employer] / Product Type: HMO /    In time:1:39P  Out time:232P  Total Treatment Time (min): 53min  Visit #: 5 of 16    Medicare/BCBS Only   Total Timed Codes (min):  53 1:1 Treatment Time:  48       Treatment Area: Pain in right shoulder [M25.511]  Pain in left shoulder [M25.512]    SUBJECTIVE  Pain Level (0-10 scale): 0/10  Any medication changes, allergies to medications, adverse drug reactions, diagnosis change, or new procedure performed?: [x] No    [] Yes (see summary sheet for update)  Subjective functional status/changes:   [] No changes reported  Patient states she always feels really good when she leaves here but last session, she was sore for 3-4 days afterwards and only started feeling better the last couple days. Still feels much improved movement but was really achy. OBJECTIVE  15 min Therapeutic Exercise:  [x]? See flow sheet :   Rationale: increase ROM and increase strength to improve the patients ability to increase A/ROM and decrease pain with movement.     15 min Therapeutic Activity:  [x]? See flow sheet :   Rationale: increase strength and improve coordination  to improve the patients ability to Tolerate basic ADLs and job-related tasks without pain. 9 min Neuromuscular Re-education:  [x]? See flow sheet :   Rationale: improve coordination, improve balance and increase proprioception  to improve the patients ability to perform activities with good form and proprioception with tactile and verbal cuing appropriately.    14 min Manual Therapy:  Grade 2-3 mobs in sidelying to scapulothoracic joint, grades 2-3 to [address] joint in supine into P/A and inf/sup directions. The manual therapy interventions were performed at a separate and distinct time from the therapeutic activities interventions.   Rationale: decrease pain, increase ROM and increase tissue extensibility to A/ROM and decrease pain with movement. With   [x]? TE   [x]? TA   [x]? neuro   []? other: Patient Education: [x]? Review HEP    [x]? Progressed/Changed HEP based on:   []? positioning   []? body mechanics   []? transfers   []? heat/ice application    []? other:       Other Objective/Functional Measures: FOTO score 74; AROM up to 145 on the Right shoulder flexion     Pain Level (0-10 scale) post treatment: 0/10    ASSESSMENT/Changes in Function: Patient is making substantial progress with ability to raise and use her arms as desired. Has been diligent with home exercises both at work and at home. Continued education today on DOMS and prognosis of therapy. Anticipate discharge to home program only in 2-4 more visits. Patient will continue to benefit from skilled PT services to modify and progress therapeutic interventions, address functional mobility deficits, address ROM deficits, address strength deficits, analyze and address soft tissue restrictions, analyze and cue movement patterns, analyze and modify body mechanics/ergonomics, assess and modify postural abnormalities, address imbalance/dizziness and instruct in home and community integration to attain remaining goals.      [x]  See Plan of Care  []  See progress note/recertification  []  See Discharge Summary         Progress towards goals / Updated goals:  Short Term Goals: To be accomplished in 5 treatments:               1 patient will have established and be I with HEP to aid with self management at discharge               EVAL issued HEP               VUIAZUU: Met, pt reports compliance with HEP                  Long Term Goals: To be accomplished in 16 treatments:               1 patient will have FOTO 56 to show significant and projected increase tolerance to ADLs and activities at home and work               EVAL 18               CURRENT MET - 76               2 patient will have overall 50% improvement to aid with increase tolerance to household and work activities               EVAL  Worse with quick movements               CURRENT feels more than 50% better - MET               3 patient will have B shoulder F 125 and abd 125 to aid with increase tolerance to shoulder height reaching                EVAL some difficulty , F right 96, left 105, abd right 65, left 98               CURRENT Right 130 flexion and abduction - MET               4 patient will have pain 1/10 to aid with increase tolerance to improved sleep patterns               EVAL 2               JSWRVEI 1-2/10 at worse - MET    PLAN  [x]  Upgrade activities as tolerated     []  Continue plan of care  []  Update interventions per flow sheet       []  Discharge due to:_  []  Other:_      Danica Bashir, PT 7/29/2021  2:14 PM    No future appointments.

## 2021-08-02 ENCOUNTER — HOSPITAL ENCOUNTER (OUTPATIENT)
Dept: PHYSICAL THERAPY | Age: 66
Discharge: HOME | End: 2021-08-02
Attending: PHYSICAL MEDICINE & REHABILITATION
Payer: COMMERCIAL

## 2021-08-02 PROCEDURE — 97140 MANUAL THERAPY 1/> REGIONS: CPT | Performed by: PHYSICAL THERAPIST

## 2021-08-02 PROCEDURE — 97112 NEUROMUSCULAR REEDUCATION: CPT | Performed by: PHYSICAL THERAPIST

## 2021-08-02 PROCEDURE — 97110 THERAPEUTIC EXERCISES: CPT | Performed by: PHYSICAL THERAPIST

## 2021-08-02 PROCEDURE — 97530 THERAPEUTIC ACTIVITIES: CPT | Performed by: PHYSICAL THERAPIST

## 2021-08-02 NOTE — PROGRESS NOTES
PT DAILY TREATMENT NOTE     Patient Name: Maribell Lawson  Date:2021  : 1955  [x]  Patient  Verified  Payor: Brando Gunn / Plan: Franklyn Wilcox / Product Type: HMO /    In time:2:17P  Out time:3:04P  Total Treatment Time (min): 53min  Visit #: 1 of 4    Medicare/BCBS Only   Total Timed Codes (min):  53 1:1 Treatment Time:  48       Treatment Area: Pain in right shoulder [M25.511]  Pain in left shoulder [M25.512]    SUBJECTIVE  Pain Level (0-10 scale): 0/10  Any medication changes, allergies to medications, adverse drug reactions, diagnosis change, or new procedure performed?: [x] No    [] Yes (see summary sheet for update)  Subjective functional status/changes:   [] No changes reported  Patient states she has felt pretty okay since last session - just a little bit of soreness in her arm the last time she was here. She has also been able to sleep on her side and cuddle with her  as well as reach behind her back with a lot more ease. OBJECTIVE  15 min Therapeutic Exercise:  [x]? ? See flow sheet :   Rationale: increase ROM and increase strength to improve the patients ability to increase A/ROM and decrease pain with movement.     15 min Therapeutic Activity:  [x]? ?  See flow sheet :   Rationale: increase strength and improve coordination  to improve the patients ability to Tolerate basic ADLs and job-related tasks without pain.      14 min Neuromuscular Re-education:  [x]? ?  See flow sheet :   Rationale: improve coordination, improve balance and increase proprioception  to improve the patients ability to perform activities with good form and proprioception with tactile and verbal cuing appropriately.     9 min Manual Therapy:  Grade 2-3 mobs in sidelying to scapulothoracic joint, grades 2-3 to [address] joint in supine into P/A and inf/sup directions.    The manual therapy interventions were performed at a separate and distinct time from the therapeutic activities interventions. Rationale: decrease pain, increase ROM and increase tissue extensibility to A/ROM and decrease pain with movement.                                                                 With   [x]? ? TE   [x]? ? TA   [x]? ? neuro   []?? other: Patient Education: [x]? ? Review HEP    [x]? ? Progressed/Changed HEP based on:   []?? positioning   []? ? body mechanics   []? ? transfers   []? ? heat/ice application    []? ? other:       Other Objective/Functional Measures: able to lift 25# to shelf overhead with little difficulty, but unable to lift more than that. Pain Level (0-10 scale) post treatment: 0/10    ASSESSMENT/Changes in Function: Patient is progressing towards goals, progressing slowly with Internal rotation. Will have a gap in treatment until 8/13/21 due to schedule availability. Anticipate discharge in 1-2 more visits. Patient will continue to benefit from skilled PT services to modify and progress therapeutic interventions, address functional mobility deficits, address ROM deficits, address strength deficits, analyze and address soft tissue restrictions, analyze and cue movement patterns, analyze and modify body mechanics/ergonomics, assess and modify postural abnormalities, address imbalance/dizziness and instruct in home and community integration to attain remaining goals. [x]  See Plan of Care  []  See progress note/recertification  []  See Discharge Summary         Progress towards goals / Updated goals:  1. Patient will be able to lift 50# box to shelf overhead with some to little activity to facilitate ease of movement when traveling. PN: much difficulty   Current: able to lift 25# to shelf overhead with little difficulty  2. Patient will be able to Reach T12 with her Right thumb to facilitate increased independence with donning and doffing her undergarments.                PN: Right SIJ   Current: with towel assist - L1 level    PLAN  [x]  Upgrade activities as tolerated []  Continue plan of care  []  Update interventions per flow sheet       []  Discharge due to:_  []  Other:_      Leatha Snyder, PT 8/2/2021  2:37 PM    Future Appointments   Date Time Provider Radha Duran   8/13/2021  2:15 PM [facility] [address]

## 2021-08-13 ENCOUNTER — HOSPITAL ENCOUNTER (OUTPATIENT)
Dept: PHYSICAL THERAPY | Age: 66
Discharge: HOME | End: 2021-08-13
Attending: PHYSICAL MEDICINE & REHABILITATION
Payer: COMMERCIAL

## 2021-08-13 PROCEDURE — 97110 THERAPEUTIC EXERCISES: CPT | Performed by: PHYSICAL THERAPIST

## 2021-08-13 PROCEDURE — 97530 THERAPEUTIC ACTIVITIES: CPT | Performed by: PHYSICAL THERAPIST

## 2021-08-13 PROCEDURE — 97112 NEUROMUSCULAR REEDUCATION: CPT | Performed by: PHYSICAL THERAPIST

## 2021-08-13 NOTE — PROGRESS NOTES
PT DISCHARGE DAILY NOTE AND [id number]  Patient name: Christine Riley Start of Care: 2021   Referral source: Tito Wise* GEU: 2/10/0838                Medical Diagnosis: Pain in right shoulder [M25.511]  Pain in left shoulder [M25.512]  Payor: Lori Herndon / Plan: [employer] / Product Type: HMO /  Onset Date:approximately 2 months ago                Treatment Diagnosis: B shoulder pain   Prior Hospitalization: see medical history Provider#: [id number]   Medications: Verified on Patient summary List    Comorbidities: arthritis, B bunion surgery, hysterectomy   Prior Level of Function:  I all areas of ADLS and activities, no AD, tolerated household and community activities and word demands, yard work and painting , drove    [place] [facility] [place] [facility] [place]: 7    Missed Visits: 6    Reporting Period : 21 to 21    Date:2021  : 1955  [x]  Patient  Verified  Payor: Lori Herndon / Plan: Monica Franklin / Product Type: HMO /    In time:2:17P  Out time:300P  Total Treatment Time (min): 43  Visit #: 2 of 4    Medicare/BCBS Only   Total Timed Codes (min):  43 1:1 Treatment Time:  43       SUBJECTIVE  Pain Level (0-10 scale): 0/10  Any medication changes, allergies to medications, adverse drug reactions, diagnosis change, or new procedure performed?: [x] No    [] Yes (see summary sheet for update)  Subjective functional status/changes:   [] No changes reported  Patient reports she is doing her exercises all day long every day and feeling much better for it. Can now get through a whole work day and work week without headaches or increased neck, shoulder, or arm pain. OBJECTIVE  15 min Therapeutic Exercise:  [x]? See flow sheet :   Rationale: increase ROM and increase strength to improve the patients ability to increase A/ROM and decrease pain with movement.     15 min Therapeutic Activity:  [x]?   See flow sheet :   Rationale: increase strength and improve coordination  to improve the patients ability to Tolerate basic ADLs and job-related tasks without pain. 13 min Neuromuscular Re-education:  [x]? See flow sheet :   Rationale: improve coordination, improve balance and increase proprioception  to improve the patients ability to perform activities with good form and proprioception with tactile and verbal cuing appropriately.     With   [x] TE   [x] TA   [x] neuro   [] other: Patient Education: [x] Review HEP    [x] Progressed/Changed HEP based on:   [x] positioning   [] body mechanics   [] transfers   [] heat/ice application    [] other:      Other Objective/Functional Measures: A/ degrees on bilateral shoulders without pain     Pain Level (0-10 scale) post treatment: 0/10    Summary of Care:  1. Patient will be able to lift 50# box to shelf overhead with some to little activity to facilitate ease of movement when traveling.               PN: much difficulty              Current: able to lift 25# to shelf overhead with little difficulty - partially met  2. Patient will be able to Reach T12 with her Right thumb to facilitate increased independence with donning and doffing her undergarments.               PN: Right SIJ              Current: with towel assist - L3 level on the Right, T12 on the Left - partially met    ASSESSMENT/Changes in Function: Patient has progressed towards or met all goals as set at initial evaluation. She was educated on appropriate sets and reps when progressing in weights and resistance to maintain gains made in therapy and prevent future injury.      Thank you for this referral!      PLAN  [x]Discontinue therapy: [x]Patient has reached or is progressing toward set goals      []Patient is non-compliant or has abdicated      []Due to lack of appreciable progress towards set goals    Charlie Bashir, PT 8/13/2021  2:42 PM

## 2021-08-13 NOTE — PROGRESS NOTES
Physical Therapy Discharge Instructions      [facility] [address], [address] [place] [address], [facility] [address]  [phone] fax    Patient: Patric Bailey  : 1955      Continue Home Exercise Program 1-2 times per day for 4-6 weeks, then decrease to 4-5 times per week    Continue with    [x] Ice  as needed 1 times per day     [x] Heat           Follow up with MD:     [] Upon completion of therapy     [x] As needed      Recommendations:     [x]   Return to activity with home program    []   Return to activity with the following modifications:       []Post Rehab Program    []Join Independent aquatic program     [x]Return to/join local gym    Additional Comments: Continue to increase your resistance every two weeks. Decrease number of reps for 1 week when increasing resistance. Then go back to previous reps at the new weight for one or two weeks.    For example:   Week 1: 2 sets of 10 reps at 10#  Week 2: 3 sets of 10 reps at 10#  Week 3: 2 sets of 6-8 reps at 15#    Alexandria Bashir, PT 2021 2:54 PM

## 2024-05-16 ENCOUNTER — HOSPITAL ENCOUNTER (OUTPATIENT)
Facility: HOSPITAL | Age: 69
Setting detail: RECURRING SERIES
Discharge: HOME | End: 2024-05-19
Payer: MEDICARE

## 2024-05-16 PROCEDURE — 97161 PT EVAL LOW COMPLEX 20 MIN: CPT

## 2024-05-16 NOTE — PROGRESS NOTES
PHYSICAL THERAPY - DAILY TREATMENT NOTE    Patient Name: Kevin Paulino    Date: 2024    : 1955  Insurance: Payor: [employer]LEY [employer] / [place]: [place] / Product Type: *No Product type* /      Patient  verified YES   Visit #   Current / Total 1 16   Time   In / Out 3:40 4:20   Pain   In / Out 4 2   Subjective Functional Status/Changes: SEE EVALUATION     TREATMENT AREA = Cervicalgia [M54.2]    OBJECTIVE        Therapeutic Procedures:  Tx Min Billable or 1:1 Min (if diff from Tx Min) Procedure, Rationale, Specifics   7  50868 Self Care/Home Management (timed):  improve patient knowledge and understanding of pain reducing techniques, positioning, activity modification, diagnosis/prognosis, and physical therapy expectations, procedures and progression  to improve patient's ability to progress to PLOF and address remaining functional goals.  (see flow sheet as applicable)     Details if applicable:         66309 Manual Therapy (timed):  decrease pain, increase ROM, increase tissue extensibility, decrease trigger points, and increase postural awareness to improve patient's ability to progress to PLOF and address remaining functional goals.  The manual therapy interventions were performed at a separate and distinct time from the therapeutic activities interventions . (see flow sheet as applicable)     Details if applicable:  SOR, UPA to mid/lower C/S (L>R), left infraspinatus, GH mobs          Details if applicable:            Details if applicable:            Details if applicable:     14 40 [facility] Totals Reminder: bill using total billable min of TIMED therapeutic procedures (example: do not include dry needle or estim unattended, both untimed codes, in totals to left)  8-22 min = 1 unit; 23-37 min = 2 units; 38-52 min = 3 units; 53-67 min = 4 units; 68-82 min = 5 units   Total Total     [x]  Patient Education billed concurrently with other procedures   [x] Review HEP    [] Progressed/Changed

## 2024-06-06 ENCOUNTER — HOSPITAL ENCOUNTER (OUTPATIENT)
Facility: HOSPITAL | Age: 69
Setting detail: RECURRING SERIES
Discharge: HOME | End: 2024-06-09
Payer: MEDICARE

## 2024-06-06 PROCEDURE — 97110 THERAPEUTIC EXERCISES: CPT

## 2024-06-06 PROCEDURE — 97140 MANUAL THERAPY 1/> REGIONS: CPT

## 2024-06-06 NOTE — PROGRESS NOTES
PHYSICAL THERAPY - DAILY TREATMENT NOTE    Patient Name: Kevin Paulino    Date: 2024    : 1955  Insurance: Payor: [employer]LEY [employer]ICARE / Plan: [place] / Product Type: *No Product type* /      Patient  verified YES   Visit #   Current / Total 2 16   Time   In / Out 5:00 5:40   Pain   In / Out 3 0   Subjective Functional Status/Changes: Says the L shoulder feels better when she puts it on top of her head. Says she hasn't had much time to do the exercises. Reports no change since IE.      TREATMENT AREA = Cervicalgia [M54.2]    OBJECTIVE        Therapeutic Procedures:  Tx Min Billable or 1:1 Min (if diff from Tx Min) Procedure, Rationale, Specifics     88196 Self Care/Home Management (timed):  improve patient knowledge and understanding of pain reducing techniques, positioning, activity modification, diagnosis/prognosis, and physical therapy expectations, procedures and progression  to improve patient's ability to progress to PLOF and address remaining functional goals.  (see flow sheet as applicable)     Details if applicable:       25  27168 Manual Therapy (timed):  decrease pain, increase ROM, increase tissue extensibility, decrease trigger points, and increase postural awareness to improve patient's ability to progress to PLOF and address remaining functional goals.  The manual therapy interventions were performed at a separate and distinct time from the therapeutic activities interventions . (see flow sheet as applicable)     Details if applicable:  SOR, UPA to mid/lower C/S (L>R), left infraspinatus, GH mobs, L levator release and UT deep tissue holds   15   21881 Therapeutic Exercise (timed):  increase ROM, strength, coordination, balance, and proprioception to improve patient's ability to progress to PLOF and address remaining functional goals. (see flow sheet as applicable)     Details if applicable:            Details if applicable:            Details if applicable:     40  [facility]

## 2024-06-20 ENCOUNTER — HOSPITAL ENCOUNTER (OUTPATIENT)
Facility: HOSPITAL | Age: 69
Setting detail: RECURRING SERIES
Discharge: HOME | End: 2024-06-23
Payer: MEDICARE

## 2024-06-20 PROCEDURE — 97110 THERAPEUTIC EXERCISES: CPT

## 2024-06-20 PROCEDURE — 97140 MANUAL THERAPY 1/> REGIONS: CPT

## 2024-06-20 PROCEDURE — 97535 SELF CARE MNGMENT TRAINING: CPT

## 2024-06-20 NOTE — PROGRESS NOTES
PHYSICAL THERAPY - DAILY TREATMENT NOTE    Patient Name: Kevin Paulino    Date: 2024    : 1955  Insurance: Payor: [employer]LEY [employer]ICARE / Plan: [place] / Product Type: *No Product type* /      Patient  verified YES   Visit #   Current / Total 3 16   Time   In / Out 4:47 5:25   Pain   In / Out 0/10 0/10   Subjective Functional Status/Changes: Says the neck is feeling better. No pain right now.     TREATMENT AREA = Cervicalgia [M54.2]    OBJECTIVE    Therapeutic Procedures:  Tx Min Billable or 1:1 Min (if diff from Tx Min) Procedure, Rationale, Specifics   10  82838 Self Care/Home Management (timed):  improve patient knowledge and understanding of pain reducing techniques, positioning, activity modification, diagnosis/prognosis, and physical therapy expectations, procedures and progression  to improve patient's ability to progress to PLOF and address remaining functional goals.  (see flow sheet as applicable)     Details if applicable:  Review of POC, HEP, and goals; Plan to continue with last 2 visits scheduled to ensure management of symptoms, then DC if pt continues to have no pain.   15  53487 Manual Therapy (timed):  decrease pain, increase ROM, increase tissue extensibility, decrease trigger points, and increase postural awareness to improve patient's ability to progress to PLOF and address remaining functional goals.  The manual therapy interventions were performed at a separate and distinct time from the therapeutic activities interventions . (see flow sheet as applicable)     Details if applicable:  SOR bilat, L levator release and UT deep tissue holds, R cervical paraspinals STM and L side glides, R UPA mobs   13   88378 Therapeutic Exercise (timed):  increase ROM, strength, coordination, balance, and proprioception to improve patient's ability to progress to PLOF and address remaining functional goals. (see flow sheet as applicable)     Details if applicable:     38  [facility] Totals

## 2024-06-20 NOTE — THERAPY RECERTIFICATION
[facility]  [address], [address],[place] [address] - Ph: [phone]  Fx: [phone]  PHYSICAL THERAPY PROGRESS NOTE  Patient Name: Kevin Paulino : 1955   Treatment/Medical Diagnosis: Cervicalgia [M54.2]   Referral Source: Mansi Pat*     Date of Initial Visit: 24 Attended Visits: 3 Missed Visits: 0     SUMMARY OF TREATMENT  Pt was seen for IE and 2 f/u visits with treatment consisting of manual therapy, therapeutic exercises, therapeutic activities, neuromuscular reeducation, and self care techniques to improve cervical musculature coordination and scapular stability along with instruction of HEP.    CURRENT STATUS  Pt has only attended 2 apt since IE due to scheduling issues, but has made some progress in PT as demonstrated by decreased max pain levels in the past 2 weeks at 4/10 and average pain level of 0/10. Pt reports the past 3 days, she has had 0/10 pain and can reach and rotate without issue. Reports a little discomfort with SB to the L still. ROM seen below. Reports no trouble sleeping, sitting for prolonged period, and reaching. Also notes she cleaned out her gutters yesterday and still feels good today. Pt notes they are completing their HEP at least once a day. Pt will continue to benefit from skilled PT to progress exercises and improve upon scapular strength to maintain progress gained. Will continue with remaining 2 apt, then likely DC if pt is still doing well.    Cervical ROM  Flexion 45  Extension 42  SB R 41 L 39 with some tightness in the L shoulder   Rot R 64 L 55 with some tightness in the L shoulder    Short Term Goals: To be accomplished in 4 weeks  Pt to report adherence and demonstrate compliance with basic HEP to allow progress between visits  Status at last Eval: Initiated  Current Status: - Compliant  Goal Met?  -yes  2.  Pt to report pain <4/10 at worst to allow improved function and

## 2024-06-25 ENCOUNTER — HOSPITAL ENCOUNTER (OUTPATIENT)
Facility: HOSPITAL | Age: 69
Setting detail: RECURRING SERIES
Discharge: HOME | End: 2024-06-28
Payer: MEDICARE

## 2024-06-25 PROCEDURE — 97140 MANUAL THERAPY 1/> REGIONS: CPT

## 2024-06-25 PROCEDURE — 97535 SELF CARE MNGMENT TRAINING: CPT
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
## 2024-06-27 ENCOUNTER — APPOINTMENT (OUTPATIENT)
Facility: HOSPITAL | Age: 69
End: 2024-06-27
Payer: MEDICARE
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
